# Patient Record
Sex: FEMALE | Race: WHITE | URBAN - METROPOLITAN AREA
[De-identification: names, ages, dates, MRNs, and addresses within clinical notes are randomized per-mention and may not be internally consistent; named-entity substitution may affect disease eponyms.]

---

## 2022-09-06 ENCOUNTER — OFFICE VISIT (OUTPATIENT)
Dept: URBAN - METROPOLITAN AREA CLINIC 58 | Facility: CLINIC | Age: 77
End: 2022-09-06
Payer: COMMERCIAL

## 2022-09-06 DIAGNOSIS — H49.21 6TH NERVE PALSY OF RIGHT EYE: Primary | ICD-10-CM

## 2022-09-06 PROCEDURE — 99203 OFFICE O/P NEW LOW 30 MIN: CPT | Performed by: OPTOMETRIST

## 2022-09-06 ASSESSMENT — INTRAOCULAR PRESSURE
OD: 14
OS: 14

## 2022-09-06 NOTE — IMPRESSION/PLAN
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 Plan: Discussed diagnosis in detail with patient. Advised patient of condition. Most likely microvascular in nature. Patient to see PCP for BP/BS check. Observation recommended from optometric stand point. Ok to patch either eye to help with double vision until resolved. Call if worsening.

## 2022-09-26 ENCOUNTER — OFFICE VISIT (OUTPATIENT)
Dept: URBAN - METROPOLITAN AREA CLINIC 58 | Facility: CLINIC | Age: 77
End: 2022-09-26
Payer: COMMERCIAL

## 2022-09-26 DIAGNOSIS — H49.21 6TH NERVE PALSY OF RIGHT EYE: Primary | ICD-10-CM

## 2022-09-26 PROCEDURE — 99213 OFFICE O/P EST LOW 20 MIN: CPT | Performed by: OPTOMETRIST

## 2022-09-26 ASSESSMENT — INTRAOCULAR PRESSURE
OS: 12
OD: 13

## 2022-09-26 NOTE — IMPRESSION/PLAN
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 Plan: 6th nerve palsy appears to have improved but patient does not seem to notice much improvement. Most likely microvascular in nature. Recommend patient to patch either eye to help with double vision until resolved. Will continue to monitor. Call if worsening. Continue with ordered MRI and stress test by PCP.

## 2022-11-01 ENCOUNTER — OFFICE VISIT (OUTPATIENT)
Dept: URBAN - METROPOLITAN AREA CLINIC 58 | Facility: CLINIC | Age: 77
End: 2022-11-01
Payer: COMMERCIAL

## 2022-11-01 DIAGNOSIS — H25.13 AGE-RELATED NUCLEAR CATARACT, BILATERAL: ICD-10-CM

## 2022-11-01 DIAGNOSIS — H49.21 6TH NERVE PALSY OF RIGHT EYE: Primary | ICD-10-CM

## 2022-11-01 DIAGNOSIS — H52.223 REGULAR ASTIGMATISM, BILATERAL: ICD-10-CM

## 2022-11-01 PROCEDURE — 99214 OFFICE O/P EST MOD 30 MIN: CPT | Performed by: OPTOMETRIST

## 2022-11-01 PROCEDURE — 92015 DETERMINE REFRACTIVE STATE: CPT | Performed by: OPTOMETRIST

## 2022-11-01 ASSESSMENT — VISUAL ACUITY
OD: 20/40
OS: 20/40

## 2022-11-01 ASSESSMENT — INTRAOCULAR PRESSURE
OS: 12
OD: 12

## 2022-11-01 NOTE — IMPRESSION/PLAN
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 Plan: Discussed diagnosis in detail with patient. Double vision much  improved, but persists.   MRI was clean, pt seeing cardiologist.  Continue to monitor BP [Palpitations] : palpitations [Heartburn] : heartburn [Negative] : Heme/Lymph [Recent Change In Weight] : ~T recent weight change [Anxiety] : anxiety [Fever] : no fever [Chills] : no chills [Fatigue] : no fatigue [Chest Pain] : no chest pain [Lower Ext Edema] : no lower extremity edema [Orthopnea] : no orthopnea [Paroxysmal Nocturnal Dyspnea] : no paroxysmal nocturnal dyspnea [Abdominal Pain] : no abdominal pain [Diarrhea] : diarrhea [Vomiting] : no vomiting [Melena] : no melena [Insomnia] : no insomnia [Depression] : no depression [FreeTextEntry2] : purposeful wgt loss through  eating and exercise [FreeTextEntry5] : occas palps, had eval with Dr. Sykes and all was benign/wnl  [FreeTextEntry7] : heartburn much improved over past year [de-identified] : flight anxiety, uses diazepam prn with good effect and med is well tolerated

## 2022-11-01 NOTE — IMPRESSION/PLAN
Impression: Age-related nuclear cataract, bilateral: H25.13. Plan: Discussed diagnosis in detail. I recommended cataract surgery.   Consult with Dr. Starr Seay for cat eval.